# Patient Record
(demographics unavailable — no encounter records)

---

## 2025-02-14 NOTE — ASSESSMENT
[FreeTextEntry1] : 68 y/o male being seen on 02/14/2024.  Has a PMH of Chronic Renal Insufficiency, DM2, Low testosterone and a recent MRI suggesting Renal Cell Carcinoma.  FHx PCA: denies Former smoker: denies Last PSA: 0.26 ng/mL (09/12/2021) Last Creatinine: 1.92 mg/dL (01/06/2025) Last eGFR: 38 ml/min/1.73 m2 (01/06/2025) Last UCx: Last Total Serum Testosterone: 359.0 ng/dL (01/06/2025) Last HgA1C: 6.5 (1/06/2025) Uro meds: Testosterone 50 mg/5GM 1x daily, Sildenafil 100 mg (10/19/2024) Diabetes meds: Metformin 1000 mg q12 hours Blood thinners: Aspirin 81 mg 1x daily  1/29/2025 MRI -  Exophytic left lower pole renal mass measuring 4.4 cm with prominent peripheral enhancement and restricted diffusion, and no significant central zone suggesting nephrosis. Findings are likely secondary to a renal cell carcinoma, clear cell subtype. A 2.4 cm left adrenal nodule, indeterminate.  The images are discussed with the patient. Possible treatments are discussed as well. Ordering CT scan abdomen/pelvis w/wo contrast to better characterize the nodule/vascularization and kidney function. Will F/U to discuss results and planning.

## 2025-02-14 NOTE — HISTORY OF PRESENT ILLNESS
[FreeTextEntry1] : 68 y/o male being seen on 02/14/2024.  Has a PMH of Chronic Renal Insufficiency, DM2, Low testosterone and a recent MRI suggesting Renal Cell Carcinoma.  FHx PCA: denies Former smoker: denies Last PSA: 0.26 ng/mL (09/12/2021) Last Creatinine: 1.92 mg/dL (01/06/2025) Last eGFR: 38 ml/min/1.73 m2 (01/06/2025) Last UCx: Last Total Serum Testosterone: 359.0 ng/dL (01/06/2025) Last HgA1C: 6.5 (1/06/2025) Uro meds: Testosterone 50 mg/5GM 1x daily, Sildenafil 100 mg (10/19/2024) Diabetes meds: Metformin 1000 mg q12 hours Blood thinners: Aspirin 81 mg 1x daily  1/29/2025 MRI -  Exophytic left lower pole renal mass measuring 4.4 cm with prominent peripheral enhancement and restricted diffusion, and no significant central zone suggesting nephrosis. Findings are likely secondary to a renal cell carcinoma, clear cell subtype. A 2.4 cm left adrenal nodule, indeterminate.

## 2025-02-14 NOTE — ASSESSMENT
[FreeTextEntry1] : 66 y/o male being seen on 02/14/2024.  Has a PMH of Chronic Renal Insufficiency, DM2, Low testosterone and a recent MRI suggesting Renal Cell Carcinoma.  FHx PCA: denies Former smoker: denies Last PSA: 0.26 ng/mL (09/12/2021) Last Creatinine: 1.92 mg/dL (01/06/2025) Last eGFR: 38 ml/min/1.73 m2 (01/06/2025) Last UCx: Last Total Serum Testosterone: 359.0 ng/dL (01/06/2025) Last HgA1C: 6.5 (1/06/2025) Uro meds: Testosterone 50 mg/5GM 1x daily, Sildenafil 100 mg (10/19/2024) Diabetes meds: Metformin 1000 mg q12 hours Blood thinners: Aspirin 81 mg 1x daily  1/29/2025 MRI -  Exophytic left lower pole renal mass measuring 4.4 cm with prominent peripheral enhancement and restricted diffusion, and no significant central zone suggesting nephrosis. Findings are likely secondary to a renal cell carcinoma, clear cell subtype. A 2.4 cm left adrenal nodule, indeterminate.  The images are discussed with the patient. Possible treatments are discussed as well. Ordering CT scan abdomen/pelvis w/wo contrast to better characterize the nodule/vascularization and kidney function. Will F/U to discuss results and planning.

## 2025-02-14 NOTE — HISTORY OF PRESENT ILLNESS
[FreeTextEntry1] : 66 y/o male being seen on 02/14/2024.  Has a PMH of Chronic Renal Insufficiency, DM2, Low testosterone and a recent MRI suggesting Renal Cell Carcinoma.  FHx PCA: denies Former smoker: denies Last PSA: 0.26 ng/mL (09/12/2021) Last Creatinine: 1.92 mg/dL (01/06/2025) Last eGFR: 38 ml/min/1.73 m2 (01/06/2025) Last UCx: Last Total Serum Testosterone: 359.0 ng/dL (01/06/2025) Last HgA1C: 6.5 (1/06/2025) Uro meds: Testosterone 50 mg/5GM 1x daily, Sildenafil 100 mg (10/19/2024) Diabetes meds: Metformin 1000 mg q12 hours Blood thinners: Aspirin 81 mg 1x daily  1/29/2025 MRI -  Exophytic left lower pole renal mass measuring 4.4 cm with prominent peripheral enhancement and restricted diffusion, and no significant central zone suggesting nephrosis. Findings are likely secondary to a renal cell carcinoma, clear cell subtype. A 2.4 cm left adrenal nodule, indeterminate.

## 2025-03-04 NOTE — HISTORY OF PRESENT ILLNESS
[FreeTextEntry1] : 68 y/o male here to discuss CT results for renal mass.  Has a PMH of Chronic Renal Insufficiency, DM2, Low testosterone and a recent MRI suggesting Renal Cell Carcinoma.  FHx PCA: denies Former smoker: denies Last PSA: 0.26 ng/mL (09/12/2021) Last Creatinine: 1.92 mg/dL (01/06/2025) Last eGFR: 38 ml/min/1.73 m2 (01/06/2025) Last UCx: Last Total Serum Testosterone: 359.0 ng/dL (01/06/2025) Last HgA1C: 6.5 (1/06/2025) Uro meds: Testosterone 50 mg/5GM 1x daily, Sildenafil 100 mg (10/19/2024) Diabetes meds: Metformin 1000 mg q12 hours Blood thinners: Aspirin 81 mg 1x daily  1/29/2025 - MRI  Exophytic left lower pole renal mass measuring 4.4 cm with prominent peripheral enhancement and restricted diffusion, and no significant central zone suggesting nephrosis. Findings are likely secondary to a renal cell carcinoma, clear cell subtype. A 2.4 cm left adrenal nodule, indeterminate.

## 2025-03-04 NOTE — ASSESSMENT
[FreeTextEntry1] : 68 y/o male here to discuss CT results for renal mass.  Has a PMH of Chronic Renal Insufficiency, DM2, Low testosterone and a recent MRI suggesting Renal Cell Carcinoma.  FHx PCA: denies Former smoker: denies Last PSA: 0.26 ng/mL (09/12/2021) Last Creatinine: 1.92 mg/dL (01/06/2025) Last eGFR: 38 ml/min/1.73 m2 (01/06/2025) Last UCx: Last Total Serum Testosterone: 359.0 ng/dL (01/06/2025) Last HgA1C: 6.5 (1/06/2025) Uro meds: Testosterone 50 mg/5GM 1x daily, Sildenafil 100 mg (10/19/2024) Diabetes meds: Metformin 1000 mg q12 hours Blood thinners: Aspirin 81 mg 1x daily  1/29/2025 - MRI  Exophytic left lower pole renal mass measuring 4.4 cm with prominent peripheral enhancement and restricted diffusion, and no significant central zone suggesting nephrosis. Findings are likely secondary to a renal cell carcinoma, clear cell subtype. A 2.4 cm left adrenal nodule, indeterminate.  2/27/2025 - CT scan w/wo contrast: Left renal calculi measuring up to 5 mm. Dominant peripherally enhancing centrally necrotic exophytic lesion is arising from the left lower renal pole measuring approximately 6.1 x 5.7 cm (4/60). No evidence of renal vein involvement. Several hypodensities in the right kidney are too small to further characterize. Unremarkable appearance of both ureters. Nonspecific left adrenal nodule. Cholelithiasis Nonobstructing left renal calculi measuring up to 5 mm. BLADDER: Suboptimally distended REPRODUCTIVE ORGANS: Unremarkable at CT. BOWEL: No bowel obstruction.   CT scan is discussed with the patient.  Ordering NM renal function scan for surgical planning, left robot assisted radical VS partial nephrectomy. Will F/U after results.

## 2025-03-04 NOTE — ASSESSMENT
[FreeTextEntry1] : 66 y/o male here to discuss CT results for renal mass.  Has a PMH of Chronic Renal Insufficiency, DM2, Low testosterone and a recent MRI suggesting Renal Cell Carcinoma.  FHx PCA: denies Former smoker: denies Last PSA: 0.26 ng/mL (09/12/2021) Last Creatinine: 1.92 mg/dL (01/06/2025) Last eGFR: 38 ml/min/1.73 m2 (01/06/2025) Last UCx: Last Total Serum Testosterone: 359.0 ng/dL (01/06/2025) Last HgA1C: 6.5 (1/06/2025) Uro meds: Testosterone 50 mg/5GM 1x daily, Sildenafil 100 mg (10/19/2024) Diabetes meds: Metformin 1000 mg q12 hours Blood thinners: Aspirin 81 mg 1x daily  1/29/2025 - MRI  Exophytic left lower pole renal mass measuring 4.4 cm with prominent peripheral enhancement and restricted diffusion, and no significant central zone suggesting nephrosis. Findings are likely secondary to a renal cell carcinoma, clear cell subtype. A 2.4 cm left adrenal nodule, indeterminate.  2/27/2025 - CT scan w/wo contrast: Left renal calculi measuring up to 5 mm. Dominant peripherally enhancing centrally necrotic exophytic lesion is arising from the left lower renal pole measuring approximately 6.1 x 5.7 cm (4/60). No evidence of renal vein involvement. Several hypodensities in the right kidney are too small to further characterize. Unremarkable appearance of both ureters. Nonspecific left adrenal nodule. Cholelithiasis Nonobstructing left renal calculi measuring up to 5 mm. BLADDER: Suboptimally distended REPRODUCTIVE ORGANS: Unremarkable at CT. BOWEL: No bowel obstruction.   CT scan is discussed with the patient.  Ordering NM renal function scan for surgical planning, left robot assisted radical VS partial nephrectomy. Will F/U after results.

## 2025-03-04 NOTE — HISTORY OF PRESENT ILLNESS
[FreeTextEntry1] : 66 y/o male here to discuss CT results for renal mass.  Has a PMH of Chronic Renal Insufficiency, DM2, Low testosterone and a recent MRI suggesting Renal Cell Carcinoma.  FHx PCA: denies Former smoker: denies Last PSA: 0.26 ng/mL (09/12/2021) Last Creatinine: 1.92 mg/dL (01/06/2025) Last eGFR: 38 ml/min/1.73 m2 (01/06/2025) Last UCx: Last Total Serum Testosterone: 359.0 ng/dL (01/06/2025) Last HgA1C: 6.5 (1/06/2025) Uro meds: Testosterone 50 mg/5GM 1x daily, Sildenafil 100 mg (10/19/2024) Diabetes meds: Metformin 1000 mg q12 hours Blood thinners: Aspirin 81 mg 1x daily  1/29/2025 - MRI  Exophytic left lower pole renal mass measuring 4.4 cm with prominent peripheral enhancement and restricted diffusion, and no significant central zone suggesting nephrosis. Findings are likely secondary to a renal cell carcinoma, clear cell subtype. A 2.4 cm left adrenal nodule, indeterminate.

## 2025-03-18 NOTE — HISTORY OF PRESENT ILLNESS
[FreeTextEntry1] : 68 y/o male with left renal mass, here to discuss NM renal function scan (surgical planning for left robot assisted radical VS partial nephrectomy) Has a PMH of Chronic Renal Insufficiency, DM2, Low testosterone and a recent MRI suggesting Renal Cell Carcinoma.  FHx PCA: denies Former smoker: denies Last PSA: 0.28 ng/mL (02/14/2025)                  0.26 ng/mL (09/12/2021) Last Creatinine: 1.79 mg/dL (2/14/2025) Last eGFR: 38 ml/min/1.73 m2 (01/06/2025) Last UCx: negative  Last Total Serum Testosterone: 359.0 ng/dL (01/06/2025) Last HgA1C: 6.5 (1/06/2025) Uro meds: Testosterone 50 mg/5GM 1x daily, Sildenafil 100 mg (10/19/2024) Diabetes meds: Metformin 1000 mg q12 hours Blood thinners: Aspirin 81 mg 1x daily  1/29/2025 - MRI  Exophytic left lower pole renal mass measuring 4.4 cm with prominent peripheral enhancement and restricted diffusion, and no significant central zone suggesting nephrosis. Findings are likely secondary to a renal cell carcinoma, clear cell subtype. A 2.4 cm left adrenal nodule, indeterminate.  2/24/2025 - CT scan w/wo contrast: Left renal calculi measuring up to 5 mm. Dominant peripherally enhancing centrally necrotic exophytic lesion is arising from the left lower renal pole measuring approximately 6.1 x 5.7 cm (4/60). No evidence of renal vein involvement. Several hypodensities in the right kidney are too small to further characterize. Unremarkable appearance of both ureters. Nonspecific left adrenal nodule. Cholelithiasis Nonobstructing left renal calculi measuring up to 5 mm. BLADDER: Suboptimally distended REPRODUCTIVE ORGANS: Unremarkable at CT. BOWEL: No bowel obstruction.   3/13/25 NM renal function scan  FINDINGS: The renal perfusion images demonstrate prompt, good flow to the kidneys. The functional images show good cortical uptake of radiopharmaceutical by 2 minutes. There is prompt appearance of activity in both collecting systems by 4 minutes. No retention of radiotracer by the kidneys that would suggest obstruction.  Differential renal function: Left kidney: 42% Right kidney: 58%  IMPRESSION: Differential renal function: Left kidney: 42% Right kidney: 58%

## 2025-03-18 NOTE — HISTORY OF PRESENT ILLNESS
[FreeTextEntry1] : 66 y/o male with left renal mass, here to discuss NM renal function scan (surgical planning for left robot assisted radical VS partial nephrectomy) Has a PMH of Chronic Renal Insufficiency, DM2, Low testosterone and a recent MRI suggesting Renal Cell Carcinoma.  FHx PCA: denies Former smoker: denies Last PSA: 0.28 ng/mL (02/14/2025)                  0.26 ng/mL (09/12/2021) Last Creatinine: 1.79 mg/dL (2/14/2025) Last eGFR: 38 ml/min/1.73 m2 (01/06/2025) Last UCx: negative  Last Total Serum Testosterone: 359.0 ng/dL (01/06/2025) Last HgA1C: 6.5 (1/06/2025) Uro meds: Testosterone 50 mg/5GM 1x daily, Sildenafil 100 mg (10/19/2024) Diabetes meds: Metformin 1000 mg q12 hours Blood thinners: Aspirin 81 mg 1x daily  1/29/2025 - MRI  Exophytic left lower pole renal mass measuring 4.4 cm with prominent peripheral enhancement and restricted diffusion, and no significant central zone suggesting nephrosis. Findings are likely secondary to a renal cell carcinoma, clear cell subtype. A 2.4 cm left adrenal nodule, indeterminate.  2/24/2025 - CT scan w/wo contrast: Left renal calculi measuring up to 5 mm. Dominant peripherally enhancing centrally necrotic exophytic lesion is arising from the left lower renal pole measuring approximately 6.1 x 5.7 cm (4/60). No evidence of renal vein involvement. Several hypodensities in the right kidney are too small to further characterize. Unremarkable appearance of both ureters. Nonspecific left adrenal nodule. Cholelithiasis Nonobstructing left renal calculi measuring up to 5 mm. BLADDER: Suboptimally distended REPRODUCTIVE ORGANS: Unremarkable at CT. BOWEL: No bowel obstruction.   3/13/25 NM renal function scan  FINDINGS: The renal perfusion images demonstrate prompt, good flow to the kidneys. The functional images show good cortical uptake of radiopharmaceutical by 2 minutes. There is prompt appearance of activity in both collecting systems by 4 minutes. No retention of radiotracer by the kidneys that would suggest obstruction.  Differential renal function: Left kidney: 42% Right kidney: 58%  IMPRESSION: Differential renal function: Left kidney: 42% Right kidney: 58%

## 2025-03-18 NOTE — ASSESSMENT
[FreeTextEntry1] : 68 y/o male with left renal mass, here to discuss NM renal function scan (surgical planning for left robot assisted radical VS partial nephrectomy) Has a PMH of Chronic Renal Insufficiency, DM2, Low testosterone and a recent MRI suggesting Renal Cell Carcinoma.  FHx PCA: denies Former smoker: denies Last PSA: 0.28 ng/mL (02/14/2025)                  0.26 ng/mL (09/12/2021) Last Creatinine: 1.79 mg/dL (2/14/2025) Last eGFR: 38 ml/min/1.73 m2 (01/06/2025) Last UCx: negative  Last Total Serum Testosterone: 359.0 ng/dL (01/06/2025) Last HgA1C: 6.5 (1/06/2025) Uro meds: Testosterone 50 mg/5GM 1x daily, Sildenafil 100 mg (10/19/2024) Diabetes meds: Metformin 1000 mg q12 hours Blood thinners: Aspirin 81 mg 1x daily  1/29/2025 - MRI  Exophytic left lower pole renal mass measuring 4.4 cm with prominent peripheral enhancement and restricted diffusion, and no significant central zone suggesting nephrosis. Findings are likely secondary to a renal cell carcinoma, clear cell subtype. A 2.4 cm left adrenal nodule, indeterminate.  2/24/2025 - CT scan w/wo contrast: Left renal calculi measuring up to 5 mm. Dominant peripherally enhancing centrally necrotic exophytic lesion is arising from the left lower renal pole measuring approximately 6.1 x 5.7 cm (4/60). No evidence of renal vein involvement. Several hypodensities in the right kidney are too small to further characterize. Unremarkable appearance of both ureters. Nonspecific left adrenal nodule. Cholelithiasis Nonobstructing left renal calculi measuring up to 5 mm. BLADDER: Suboptimally distended REPRODUCTIVE ORGANS: Unremarkable at CT. BOWEL: No bowel obstruction.   3/13/25 NM renal function scan  FINDINGS: The renal perfusion images demonstrate prompt, good flow to the kidneys. The functional images show good cortical uptake of radiopharmaceutical by 2 minutes. There is prompt appearance of activity in both collecting systems by 4 minutes. No retention of radiotracer by the kidneys that would suggest obstruction.  Differential renal function: Left kidney: 42% Right kidney: 58%  IMPRESSION: Differential renal function: Left kidney: 42% Right kidney: 58%  I discussed the NM results with the patient. In consideration of the dimension of the mass, the risk for conversion from partial to radical nephrectomy has to be considered in the planning (RENAL PADUA score = 10) I explained to the patient that his chronic renal insufficiency could worsen in case of radical nephrectomy, considering the differential renal function (L42% / R58%) The case will be presented to the tumor board for evaluation. Will F/U to discuss planning.

## 2025-03-18 NOTE — ASSESSMENT
[FreeTextEntry1] : 66 y/o male with left renal mass, here to discuss NM renal function scan (surgical planning for left robot assisted radical VS partial nephrectomy) Has a PMH of Chronic Renal Insufficiency, DM2, Low testosterone and a recent MRI suggesting Renal Cell Carcinoma.  FHx PCA: denies Former smoker: denies Last PSA: 0.28 ng/mL (02/14/2025)                  0.26 ng/mL (09/12/2021) Last Creatinine: 1.79 mg/dL (2/14/2025) Last eGFR: 38 ml/min/1.73 m2 (01/06/2025) Last UCx: negative  Last Total Serum Testosterone: 359.0 ng/dL (01/06/2025) Last HgA1C: 6.5 (1/06/2025) Uro meds: Testosterone 50 mg/5GM 1x daily, Sildenafil 100 mg (10/19/2024) Diabetes meds: Metformin 1000 mg q12 hours Blood thinners: Aspirin 81 mg 1x daily  1/29/2025 - MRI  Exophytic left lower pole renal mass measuring 4.4 cm with prominent peripheral enhancement and restricted diffusion, and no significant central zone suggesting nephrosis. Findings are likely secondary to a renal cell carcinoma, clear cell subtype. A 2.4 cm left adrenal nodule, indeterminate.  2/24/2025 - CT scan w/wo contrast: Left renal calculi measuring up to 5 mm. Dominant peripherally enhancing centrally necrotic exophytic lesion is arising from the left lower renal pole measuring approximately 6.1 x 5.7 cm (4/60). No evidence of renal vein involvement. Several hypodensities in the right kidney are too small to further characterize. Unremarkable appearance of both ureters. Nonspecific left adrenal nodule. Cholelithiasis Nonobstructing left renal calculi measuring up to 5 mm. BLADDER: Suboptimally distended REPRODUCTIVE ORGANS: Unremarkable at CT. BOWEL: No bowel obstruction.   3/13/25 NM renal function scan  FINDINGS: The renal perfusion images demonstrate prompt, good flow to the kidneys. The functional images show good cortical uptake of radiopharmaceutical by 2 minutes. There is prompt appearance of activity in both collecting systems by 4 minutes. No retention of radiotracer by the kidneys that would suggest obstruction.  Differential renal function: Left kidney: 42% Right kidney: 58%  IMPRESSION: Differential renal function: Left kidney: 42% Right kidney: 58%  I discussed the NM results with the patient. In consideration of the dimension of the mass, the risk for conversion from partial to radical nephrectomy has to be considered in the planning (RENAL PADUA score = 10) I explained to the patient that his chronic renal insufficiency could worsen in case of radical nephrectomy, considering the differential renal function (L42% / R58%) The case will be presented to the tumor board for evaluation. Will F/U to discuss planning.

## 2025-04-11 NOTE — HISTORY OF PRESENT ILLNESS
[FreeTextEntry1] : 66 y/o obese male with left renal mass, here to discuss surgical planning after tumor board decision. Has a PMH of Chronic Renal Insufficiency, DM2, Low testosterone and a recent MRI suggesting Renal Cell Carcinoma. H/O lap gastric bypass in 2009, the patient states that he doesn't have any documentation about the surgery.  FHx PCA: denies Former smoker: denies Last PSA: 0.28 ng/mL (02/14/2025)                  0.26 ng/mL (09/12/2021) Last Creatinine: 1.79 mg/dL (2/14/2025) Last eGFR: 38 ml/min/1.73 m2 (01/06/2025) Last UCx: negative  Last Total Serum Testosterone: 359.0 ng/dL (01/06/2025) Last HgA1C: 6.5 (1/06/2025) Uro meds: Testosterone 50 mg/5GM 1x daily, Sildenafil 100 mg (10/19/2024) Diabetes meds: Metformin 1000 mg q12 hours Blood thinners: Aspirin 81 mg 1x daily  1/29/2025 - MRI  Exophytic left lower pole renal mass measuring 4.4 cm with prominent peripheral enhancement and restricted diffusion, and no significant central zone suggesting nephrosis. Findings are likely secondary to a renal cell carcinoma, clear cell subtype. A 2.4 cm left adrenal nodule, indeterminate.  2/24/2025 - CT scan w/wo contrast: Left renal calculi measuring up to 5 mm. Dominant peripherally enhancing centrally necrotic exophytic lesion is arising from the left lower renal pole measuring approximately 6.1 x 5.7 cm (4/60). No evidence of renal vein involvement. Several hypodensities in the right kidney are too small to further characterize. Unremarkable appearance of both ureters. Nonspecific left adrenal nodule. Cholelithiasis Nonobstructing left renal calculi measuring up to 5 mm. BLADDER: Suboptimally distended REPRODUCTIVE ORGANS: Unremarkable at CT. BOWEL: No bowel obstruction.   3/13/25 NM renal function scan  FINDINGS: The renal perfusion images demonstrate prompt, good flow to the kidneys. The functional images show good cortical uptake of radiopharmaceutical by 2 minutes. There is prompt appearance of activity in both collecting systems by 4 minutes. No retention of radiotracer by the kidneys that would suggest obstruction.  Differential renal function: Left kidney: 42% Right kidney: 58%  IMPRESSION: Differential renal function: Left kidney: 42% Right kidney: 58%

## 2025-04-11 NOTE — ASSESSMENT
[FreeTextEntry1] : 68 y/o obese male with left renal mass, here to discuss surgical planning after tumor board decision. Has a PMH of Chronic Renal Insufficiency, DM2, Low testosterone and a recent MRI suggesting Renal Cell Carcinoma. H/O lap gastric bypass in 2009, the patient states that he doesn't have any documentation about the surgery.  FHx PCA: denies Former smoker: denies Last PSA: 0.28 ng/mL (02/14/2025)                  0.26 ng/mL (09/12/2021) Last Creatinine: 1.79 mg/dL (2/14/2025) Last eGFR: 38 ml/min/1.73 m2 (01/06/2025) Last UCx: negative  Last Total Serum Testosterone: 359.0 ng/dL (01/06/2025) Last HgA1C: 6.5 (1/06/2025) Uro meds: Testosterone 50 mg/5GM 1x daily, Sildenafil 100 mg (10/19/2024) Diabetes meds: Metformin 1000 mg q12 hours Blood thinners: Aspirin 81 mg 1x daily  1/29/2025 - MRI  Exophytic left lower pole renal mass measuring 4.4 cm with prominent peripheral enhancement and restricted diffusion, and no significant central zone suggesting nephrosis. Findings are likely secondary to a renal cell carcinoma, clear cell subtype. A 2.4 cm left adrenal nodule, indeterminate.  2/24/2025 - CT scan w/wo contrast: Left renal calculi measuring up to 5 mm. Dominant peripherally enhancing centrally necrotic exophytic lesion is arising from the left lower renal pole measuring approximately 6.1 x 5.7 cm (4/60). No evidence of renal vein involvement. Several hypodensities in the right kidney are too small to further characterize. Unremarkable appearance of both ureters. Nonspecific left adrenal nodule. Cholelithiasis Nonobstructing left renal calculi measuring up to 5 mm. BLADDER: Suboptimally distended REPRODUCTIVE ORGANS: Unremarkable at CT. BOWEL: No bowel obstruction.   3/13/25 NM renal function scan  FINDINGS: The renal perfusion images demonstrate prompt, good flow to the kidneys. The functional images show good cortical uptake of radiopharmaceutical by 2 minutes. There is prompt appearance of activity in both collecting systems by 4 minutes. No retention of radiotracer by the kidneys that would suggest obstruction.  Differential renal function: Left kidney: 42% Right kidney: 58%  IMPRESSION: Differential renal function: Left kidney: 42% Right kidney: 58%  The case was discussed with tumor board on 04/09/2025. According to the tumor board, the case would be best approached with a left robotic partial nephrectomy, prior 3D reconstruction of the CT scan for optimal preoperative planning. In consideration of the left adrenal nodule (nonspecific) and of the different possible pathological classifications of the renal mass, the specimen will be sent for frozen section. Following frozen section results and depending on the staging, the procedure could continue with left radical nephrectomy and eventual left adrenalectomy. Possible conversion to open surgery, in case of major complication, is discussed. I discussed the tumor board decision with the patient. In consideration of the dimension of the mass, the risk for conversion from partial to radical nephrectomy has to be considered in the planning (RENAL PADUA score = 10) I explained to the patient that his chronic renal insufficiency could worsen in case of radical nephrectomy, considering the differential renal function (L42% / R58%).  The verbalized understanding and agreed with the planning.  Robot assisted left partial nephrectomy is booked.

## 2025-04-24 NOTE — ASSESSMENT
[Patient NOT optimized for Surgery at this time] : Patient not optimized for surgery at this time [As per surgery] : as per surgery [FreeTextEntry4] : # Pre Op Clearance  - EKG SR with RBBB, no ischemic changes - pending cardiology clearance.  - Labs CBC/CMP/Coags/A1c - stable  - BP at goal - Patient RCI score Class 1 Risk - Patient medically optimized to proceed with planned procedure, pending cardiology clearance.

## 2025-04-24 NOTE — PHYSICAL EXAM
[No Acute Distress] : no acute distress [Well Nourished] : well nourished [Well Developed] : well developed [Well-Appearing] : well-appearing [Normal Sclera/Conjunctiva] : normal sclera/conjunctiva [PERRL] : pupils equal round and reactive to light [EOMI] : extraocular movements intact [Normal Outer Ear/Nose] : the outer ears and nose were normal in appearance [Normal Oropharynx] : the oropharynx was normal [No JVD] : no jugular venous distention [No Lymphadenopathy] : no lymphadenopathy [Supple] : supple [Thyroid Normal, No Nodules] : the thyroid was normal and there were no nodules present [No Respiratory Distress] : no respiratory distress  [No Accessory Muscle Use] : no accessory muscle use [Clear to Auscultation] : lungs were clear to auscultation bilaterally [Normal Rate] : normal rate  [Regular Rhythm] : with a regular rhythm [Normal S1, S2] : normal S1 and S2 [No Murmur] : no murmur heard [No Abdominal Bruit] : a ~M bruit was not heard ~T in the abdomen [No Varicosities] : no varicosities [Pedal Pulses Present] : the pedal pulses are present [No Palpable Aorta] : no palpable aorta [No Extremity Clubbing/Cyanosis] : no extremity clubbing/cyanosis [Soft] : abdomen soft [Non Tender] : non-tender [Non-distended] : non-distended [No Masses] : no abdominal mass palpated [No HSM] : no HSM [Normal Bowel Sounds] : normal bowel sounds [Normal Posterior Cervical Nodes] : no posterior cervical lymphadenopathy [Normal Anterior Cervical Nodes] : no anterior cervical lymphadenopathy [No CVA Tenderness] : no CVA  tenderness [No Spinal Tenderness] : no spinal tenderness [No Joint Swelling] : no joint swelling [Grossly Normal Strength/Tone] : grossly normal strength/tone [No Rash] : no rash [Coordination Grossly Intact] : coordination grossly intact [No Focal Deficits] : no focal deficits [Normal Gait] : normal gait [Deep Tendon Reflexes (DTR)] : deep tendon reflexes were 2+ and symmetric [Normal Affect] : the affect was normal [Normal Insight/Judgement] : insight and judgment were intact [Normal TMs] : both tympanic membranes were normal [No Carotid Bruits] : no carotid bruits [No Edema] : there was no peripheral edema [Alert and Oriented x3] : oriented to person, place, and time [de-identified] : Class IV pharynx

## 2025-04-24 NOTE — HISTORY OF PRESENT ILLNESS
[Sleep Apnea] : sleep apnea [(Patient denies any chest pain, claudication, dyspnea on exertion, orthopnea, palpitations or syncope)] : Patient denies any chest pain, claudication, dyspnea on exertion, orthopnea, palpitations or syncope [Moderate (4-6 METs)] : Moderate (4-6 METs) [Aortic Stenosis] : no aortic stenosis [Atrial Fibrillation] : no atrial fibrillation [Coronary Artery Disease] : no coronary artery disease [Recent Myocardial Infarction] : no recent myocardial infarction [Implantable Device/Pacemaker] : no implantable device/pacemaker [Asthma] : no asthma [COPD] : no COPD [Smoker] : not a smoker [Self] : no previous adverse anesthesia reaction [FreeTextEntry1] : partial left nephrectomy with frozen section, possible conversion to radical, possible left adrenalectomy, possible open [FreeTextEntry2] : 05/02/25 [FreeTextEntry3] : Dr. Valdez [FreeTextEntry4] : Mr. MAINOR PETERSON is a 67-year-old male with T2DM, HLD, CKD, gastric band in 2009, ENMA on CPAP presents today for pre op evaluation dx with a L renal cell carcinoma, scheduled for partial nephrectomy.  Seen by cardiologist, waiting for echo prior to clearance On Ozempic for T2DM - last dose was on 4/19. Advised to hold Ozempic.

## 2025-04-24 NOTE — PHYSICAL EXAM
[No Acute Distress] : no acute distress [Well Nourished] : well nourished [Well Developed] : well developed [Well-Appearing] : well-appearing [Normal Sclera/Conjunctiva] : normal sclera/conjunctiva [PERRL] : pupils equal round and reactive to light [EOMI] : extraocular movements intact [Normal Outer Ear/Nose] : the outer ears and nose were normal in appearance [Normal Oropharynx] : the oropharynx was normal [No JVD] : no jugular venous distention [No Lymphadenopathy] : no lymphadenopathy [Supple] : supple [Thyroid Normal, No Nodules] : the thyroid was normal and there were no nodules present [No Respiratory Distress] : no respiratory distress  [No Accessory Muscle Use] : no accessory muscle use [Clear to Auscultation] : lungs were clear to auscultation bilaterally [Normal Rate] : normal rate  [Regular Rhythm] : with a regular rhythm [Normal S1, S2] : normal S1 and S2 [No Murmur] : no murmur heard [No Abdominal Bruit] : a ~M bruit was not heard ~T in the abdomen [No Varicosities] : no varicosities [Pedal Pulses Present] : the pedal pulses are present [No Palpable Aorta] : no palpable aorta [No Extremity Clubbing/Cyanosis] : no extremity clubbing/cyanosis [Soft] : abdomen soft [Non Tender] : non-tender [Non-distended] : non-distended [No Masses] : no abdominal mass palpated [No HSM] : no HSM [Normal Bowel Sounds] : normal bowel sounds [Normal Posterior Cervical Nodes] : no posterior cervical lymphadenopathy [Normal Anterior Cervical Nodes] : no anterior cervical lymphadenopathy [No CVA Tenderness] : no CVA  tenderness [No Spinal Tenderness] : no spinal tenderness [No Joint Swelling] : no joint swelling [Grossly Normal Strength/Tone] : grossly normal strength/tone [No Rash] : no rash [Coordination Grossly Intact] : coordination grossly intact [No Focal Deficits] : no focal deficits [Normal Gait] : normal gait [Deep Tendon Reflexes (DTR)] : deep tendon reflexes were 2+ and symmetric [Normal Affect] : the affect was normal [Normal Insight/Judgement] : insight and judgment were intact [Normal TMs] : both tympanic membranes were normal [No Carotid Bruits] : no carotid bruits [No Edema] : there was no peripheral edema [Alert and Oriented x3] : oriented to person, place, and time [de-identified] : Class IV pharynx

## 2025-04-25 NOTE — DISCUSSION/SUMMARY
[EKG obtained to assist in diagnosis and management of assessed problem(s)] : EKG obtained to assist in diagnosis and management of assessed problem(s) [FreeTextEntry1] : 68yo man with PMH of DM2, HLD, gastric lap band (2009), gout, and CKD3 (baseline Cr 1.4); here for pre-op eval. dx with a L renal cell carcinoma - sx for excision 5/2/25 Stress test 2022 normal with nl LVEF and no ischemia or infarct EKG has remains sinus RBBB Denied CP/palpsdyspnea/syncope. Able to climb 2 flights of stairs (>4METS of exercise) without issue. VSS otherwise. Echo yesterday normal with nl LVEF, no WMA, no significant valvular lesions. At low cardiovascular risk for a low risk procedure. No cardiac contraindication to proceeding.

## 2025-04-25 NOTE — HISTORY OF PRESENT ILLNESS
[FreeTextEntry1] : 65yo man with PMH of DM2, HLD, gastric lap band (2009), gout, and CKD3 (baseline Cr 1.4); presented to the ED with dizziness and "chest twinges".  Patient found to have hypertensive urgency and started on anti-hypertensives - initially Losartan and added Procardia, but with elevated Cr, Losartan D/South and per house cardiology initiated Coreg to continue with Procardia. Twinge seems to have resolved with better blood pressure control.   TTE showing hypokinetic areas. They recommended ischemic work up with CT Coronaries, but not ideal given his CKD3. Per inhouse cardiology, plan to pursue ischemic work up as outpatient.  Here today for follow-up. Compliant with meds but continues to have chest twinges.  4/17/25: dx with a L renal cell carcinoma - sx for excision 5/2/25 Stress test 2022 normal with nl LVEF and no ischemia or infarct EKG has remains sinus RBBB Denied CP/palpsdyspnea/syncope. Able to climb 2 flights of stairs (>4METS of exercise) without issue. VSS otherwise.

## 2025-04-25 NOTE — HISTORY OF PRESENT ILLNESS
[FreeTextEntry1] : 63yo man with PMH of DM2, HLD, gastric lap band (2009), gout, and CKD3 (baseline Cr 1.4); presented to the ED with dizziness and "chest twinges".  Patient found to have hypertensive urgency and started on anti-hypertensives - initially Losartan and added Procardia, but with elevated Cr, Losartan D/South and per house cardiology initiated Coreg to continue with Procardia. Twinge seems to have resolved with better blood pressure control.   TTE showing hypokinetic areas. They recommended ischemic work up with CT Coronaries, but not ideal given his CKD3. Per inhouse cardiology, plan to pursue ischemic work up as outpatient.  Here today for follow-up. Compliant with meds but continues to have chest twinges.  4/17/25: dx with a L renal cell carcinoma - sx for excision 5/2/25 Stress test 2022 normal with nl LVEF and no ischemia or infarct EKG has remains sinus RBBB Denied CP/palpsdyspnea/syncope. Able to climb 2 flights of stairs (>4METS of exercise) without issue. VSS otherwise.

## 2025-04-25 NOTE — DISCUSSION/SUMMARY
[EKG obtained to assist in diagnosis and management of assessed problem(s)] : EKG obtained to assist in diagnosis and management of assessed problem(s) [FreeTextEntry1] : 66yo man with PMH of DM2, HLD, gastric lap band (2009), gout, and CKD3 (baseline Cr 1.4); here for pre-op eval. dx with a L renal cell carcinoma - sx for excision 5/2/25 Stress test 2022 normal with nl LVEF and no ischemia or infarct EKG has remains sinus RBBB Denied CP/palpsdyspnea/syncope. Able to climb 2 flights of stairs (>4METS of exercise) without issue. VSS otherwise. Echo yesterday normal with nl LVEF, no WMA, no significant valvular lesions. At low cardiovascular risk for a low risk procedure. No cardiac contraindication to proceeding.

## 2025-06-03 NOTE — ASSESSMENT
[FreeTextEntry1] : 66 y/o male s/p left partial nephrectomy for 6.5cm mass. Here to discuss pathology results. Has a PMH of Chronic Renal Insufficiency, DM2, Low testosterone and a recent MRI suggesting Renal Cell Carcinoma.  FHx PCA: denies Former smoker: denies Last PSA: 0.28 ng/mL (02/14/2025)                  0.26 ng/mL (09/12/2021) Last Creatinine: 1.79 mg/dL (2/14/2025) Last eGFR: 38 ml/min/1.73 m2 (01/06/2025) Last UCx: negative  Last Total Serum Testosterone: 359.0 ng/dL (01/06/2025) Last HgA1C: 6.5 (1/06/2025) Uro meds: Testosterone 50 mg/5GM 1x daily, Sildenafil 100 mg (10/19/2024) Diabetes meds: Metformin 1000 mg q12 hours Blood thinners: Aspirin 81 mg 1x daily  1/29/2025 - MRI  Exophytic left lower pole renal mass measuring 4.4 cm with prominent peripheral enhancement and restricted diffusion, and no significant central zone suggesting nephrosis. Findings are likely secondary to a renal cell carcinoma, clear cell subtype. A 2.4 cm left adrenal nodule, indeterminate.  2/24/2025 - CT scan w/wo contrast: Left renal calculi measuring up to 5 mm. Dominant peripherally enhancing centrally necrotic exophytic lesion is arising from the left lower renal pole measuring approximately 6.1 x 5.7 cm (4/60). No evidence of renal vein involvement. Several hypodensities in the right kidney are too small to further characterize. Unremarkable appearance of both ureters. Nonspecific left adrenal nodule. Cholelithiasis Nonobstructing left renal calculi measuring up to 5 mm. BLADDER: Suboptimally distended REPRODUCTIVE ORGANS: Unremarkable at CT. BOWEL: No bowel obstruction.   3/13/25 NM renal function scan  FINDINGS: The renal perfusion images demonstrate prompt, good flow to the kidneys. The functional images show good cortical uptake of radiopharmaceutical by 2 minutes. There is prompt appearance of activity in both collecting systems by 4 minutes. No retention of radiotracer by the kidneys that would suggest obstruction.  Differential renal function: Left kidney: 42% Right kidney: 58%  IMPRESSION: Differential renal function: Left kidney: 42% Right kidney: 58%  06/03/2025 - Pathology 1. Specimen Laterality:   Left Tumor Size:  7.0 Centimeters (cm) Histologic Type:   Clear cell renal cell carcinoma Histologic Grade (WHO / [facility]UP):   G2 Tumor Extent:   Limited to kidney Tumor Necrosis: Present Percentage of Tumor Necrosis: 25% Lymphatic and / or Vascular Invasion: Not identified Margin Comment:   Tumor is present at the inked parenchymal margin in the area of tissue fragmentation. This findings might be due to postoperative tumor fragmentation or represent a true positive margin. Final margin status is recommended to determine clinically  2. "Left peritumoral fat", resection: - Benign fibroadipose tissue.  Intraoperative Consultation Left renal mass, frozen section: Renal cell carcinoma, at least G2.  Pathology report is discussed with the patient. Will F/U in 6 months with CT scan w/wo contrast.

## 2025-06-03 NOTE — HISTORY OF PRESENT ILLNESS
[FreeTextEntry1] : 66 y/o male s/p left partial nephrectomy for 6.5cm mass. Here to discuss pathology results. Has a PMH of Chronic Renal Insufficiency, DM2, Low testosterone and a recent MRI suggesting Renal Cell Carcinoma.  FHx PCA: denies Former smoker: denies Last PSA: 0.28 ng/mL (02/14/2025)                  0.26 ng/mL (09/12/2021) Last Creatinine: 1.79 mg/dL (2/14/2025) Last eGFR: 38 ml/min/1.73 m2 (01/06/2025) Last UCx: negative  Last Total Serum Testosterone: 359.0 ng/dL (01/06/2025) Last HgA1C: 6.5 (1/06/2025) Uro meds: Testosterone 50 mg/5GM 1x daily, Sildenafil 100 mg (10/19/2024) Diabetes meds: Metformin 1000 mg q12 hours Blood thinners: Aspirin 81 mg 1x daily  1/29/2025 - MRI  Exophytic left lower pole renal mass measuring 4.4 cm with prominent peripheral enhancement and restricted diffusion, and no significant central zone suggesting nephrosis. Findings are likely secondary to a renal cell carcinoma, clear cell subtype. A 2.4 cm left adrenal nodule, indeterminate.  2/24/2025 - CT scan w/wo contrast: Left renal calculi measuring up to 5 mm. Dominant peripherally enhancing centrally necrotic exophytic lesion is arising from the left lower renal pole measuring approximately 6.1 x 5.7 cm (4/60). No evidence of renal vein involvement. Several hypodensities in the right kidney are too small to further characterize. Unremarkable appearance of both ureters. Nonspecific left adrenal nodule. Cholelithiasis Nonobstructing left renal calculi measuring up to 5 mm. BLADDER: Suboptimally distended REPRODUCTIVE ORGANS: Unremarkable at CT. BOWEL: No bowel obstruction.   3/13/25 NM renal function scan  FINDINGS: The renal perfusion images demonstrate prompt, good flow to the kidneys. The functional images show good cortical uptake of radiopharmaceutical by 2 minutes. There is prompt appearance of activity in both collecting systems by 4 minutes. No retention of radiotracer by the kidneys that would suggest obstruction.  Differential renal function: Left kidney: 42% Right kidney: 58%  IMPRESSION: Differential renal function: Left kidney: 42% Right kidney: 58%

## 2025-07-16 NOTE — PHYSICAL EXAM
[Normal Appearance] : normal appearance [III] : III [Neck Appearance] : the appearance of the neck was normal [Heart Sounds] : normal S1 and S2 [Auscultation Breath Sounds / Voice Sounds] : lungs were clear to auscultation bilaterally [Oriented To Time, Place, And Person] : oriented to person, place, and time

## 2025-07-17 NOTE — HISTORY OF PRESENT ILLNESS
[FreeTextEntry1] : 68 yo male with PMH DM2, HLD, bariatric surgery, gout, glaucoma, CKD3, renal cell carcinoma (sp partial nephrectomy 5/2025), and ENMA who presents for sleep apnea evaluation.   He was diagnosed with ENMA in 1998. He has been using CPAP since that time with improvement in sleep. Has not gotten a new CPAP machine in at least 12 years. Would like a new machine. He is unsure of his [employer] company, what kind of machine he has, setting of machine.   Sleep routine: 11pm-6am Latency: few minutes Night time awakenings: 1-2 to use bathroom Witnessed apneas: no Snoring: no Hallucinations/Night time activities: no Paralysis/Cataplexy: no Sleep quality: restorative ESS: Chance of dozing.............Situation 2........................................Sitting and reading 2........................................Watching TV 1........................................Sitting inactive in a public place (eg a theatre or a meeting) 1........................................As a passenger in a car for an hour without a break 3........................................Lying down to rest in the afternoon when circumstances permit 0........................................Sitting and talking to someone 1........................................Sitting quietly after lunch without alcohol 0........................................In a car, while stopped for a few minutes in traffic 10........................................TOTAL SCORE   0 = Never would doze 1 = Slight chance of dozing 2 = Moderate chance of dozing 3 = High chance of dozing

## 2025-07-17 NOTE — REVIEW OF SYSTEMS
[Fatigue] : fatigue [Nasal Congestion] : nasal congestion [Obesity] : obesity [Nocturia] : nocturia [Snoring] : no snoring

## 2025-07-17 NOTE — ASSESSMENT
[FreeTextEntry1] : 66 yo male with PMH DM2, HLD, bariatric surgery, gout, glaucoma, CKD3, renal cell carcinoma (sp partial nephrectomy 5/2025), and ENMA who presents for sleep apnea evaluation.   # ENMA Diagnosed in 1998 and has been using CPAP since that time. He is unsure of what kind of machine he has or setting of machine. Would like a new machine - we will need a dx study to determine current level of enma - prior studies not available.  - HST ordered today, adviced to stop cpap use for 2 nights prior to HST to obtain accurate baseline assessment - once HST is completed, can order new CPAP machine - can order home APAP to assess for optimal pressure - will order new mask/supplies when new machine is ordered - weight loss discussed, currently on ozempic per her endocrinologist - counselled on the health risks of associated with ENMA, including HTN, cardiovascular disease, arrhythmia and stroke - counselled on the dangers of drowsy driving/

## 2025-07-17 NOTE — HISTORY OF PRESENT ILLNESS
[FreeTextEntry1] : 68 yo male with PMH DM2, HLD, bariatric surgery, gout, glaucoma, CKD3, renal cell carcinoma (sp partial nephrectomy 5/2025), and ENMA who presents for sleep apnea evaluation.   He was diagnosed with ENMA in 1998. He has been using CPAP since that time with improvement in sleep. Has not gotten a new CPAP machine in at least 12 years. Would like a new machine. He is unsure of his [employer] company, what kind of machine he has, setting of machine.   Sleep routine: 11pm-6am Latency: few minutes Night time awakenings: 1-2 to use bathroom Witnessed apneas: no Snoring: no Hallucinations/Night time activities: no Paralysis/Cataplexy: no Sleep quality: restorative ESS: Chance of dozing.............Situation 2........................................Sitting and reading 2........................................Watching TV 1........................................Sitting inactive in a public place (eg a theatre or a meeting) 1........................................As a passenger in a car for an hour without a break 3........................................Lying down to rest in the afternoon when circumstances permit 0........................................Sitting and talking to someone 1........................................Sitting quietly after lunch without alcohol 0........................................In a car, while stopped for a few minutes in traffic 10........................................TOTAL SCORE   0 = Never would doze 1 = Slight chance of dozing 2 = Moderate chance of dozing 3 = High chance of dozing

## 2025-07-21 NOTE — ADDENDUM
[FreeTextEntry1] : I, Eddi Valentin wrote this note acting as a scribe for Dr. Israel Morris on 07/21/2025.   All medical record entries made by the Scribe were at my, Dr. Israel Morris MD., direction and personally dictated by me on 07/21/2025. I have personally reviewed the chart and agree that the record accurately reflects my personal performance of the history, physical exam, assessment and plan.

## 2025-07-21 NOTE — DISCUSSION/SUMMARY
[FreeTextEntry1] : The underlying pathophysiology was reviewed with the patient. XR films were reviewed with the patient. Discussed at length the nature of the patients condition. 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   Treatment options were discussed including; observation, NSAIDS, analgesics, injection, ROM exercises.   Patient was advised to try OTC medication and topical analgesics for pain management. I recommend that the patient utilize Tylenol, Advil, Aleve, Voltaren gel, Icy Hot, Biofreeze, Bengay, or 4% lidocaine patches.  Gentle range of motion, stretching, and strengthening exercises were encouraged. Increase activity as tolerated. Do not keep it stiff. Soak in warm water and epsom salt to alleviate stiffness.   No restrictions on ADLs. Patient may continue participating in all physical activities without restrictions, as tolerated.  All questions answered, understanding verbalized. Patient in agreement with plan of care. Patient is advised to follow-up as needed.  If the patient begins to experience any changes or severe exacerbation of his symptoms, he should reach out to me as soon as possible.   All questions answered, understanding verbalized. Patient in agreement with plan of care. Patient is advised to follow-up in If the patient begins to experience any changes or severe exacerbation of his symptoms, he should reach out to me as soon as possible.

## 2025-07-21 NOTE — HISTORY OF PRESENT ILLNESS
[FreeTextEntry1] : Pt is a 66 y/o male c/o left ring finger pain and swelling x 6-7 months.  Denies injury.  He has swelling at the PIP joint.  He had to have his wedding ring cut off.  He cannot make a tight fist.  He has mild tenderness to palpation.  It bothers him when he types or plays the guitar.  He had xrays in March which were negative for fracture.

## 2025-07-21 NOTE — PHYSICAL EXAM
[de-identified] : Patient is WDWN, alert, and in no acute distress. Breathing is unlabored. He is grossly oriented to person, place, and time.  Left hand: There is no A1 pulley tenderness in the left ring finger. There is edema and tenderness over the PIP joint of the ring finger with palpation. Full arc of motion in the fingers, and all intrinsic and extrinsic hand muscles 5/5. No joint instability on provocative testing, sensation is intact to light touch, and no skin lesions or discoloration.  [de-identified] : 3v left hand x ray was obtained from an outside facility in March 2025 and revealed narrowing of the joint space in the PIP ring finger joint; consistent with osteoarthritis.  No other abnormalities. No acute fracture. No dislocation. Cartilage spaces are maintained.